# Patient Record
(demographics unavailable — no encounter records)

---

## 2024-12-26 NOTE — ASSESSMENT
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  She underwent bilateral mmg/us in 6/2024 which showed bilateral breast cysts and benign calcifications (BIRADS 2) with annual mmg recommended.  We discussed her most recent imaging in detail. We discussed it was benign.  We discussed breast cysts. They are not pre-malignant nor do they have malignant potential and are hormonally influenced. They may grow or shrink in size as well as resolve spontaneously and there is usually no intervention unless they are symptomatic. In several large studies, patients with breast cysts and a positive family history had a higher relative risk of breast cancer (from 1.5 to 3).  We discussed breast density. Increasing breast density has been found to increase ones risk of breast cancer, but at this time, there is no clear indication for additional imaging in this setting, as both US and MRI have not been found to improve survival. One can consider bilateral screening US. However, out of 1000 women screened, the use of routine US will only identify an additional 3-5 cancers. The use of US was found to increase the likelihood of undergoing more imaging and more biopsies. She does not have dense breasts. She wishes to obtain screening USs.  All questions and concerns were answered in detail.  Patient is for bilateral mmg/us in 6/2025. She is for follow up after imaging, pending any interval changes.  Total time spent on encounter was greater than 45 minutes , which included face to face time with the patient, performing an exam, reviewing previous medical records, reviewing current imaging/ pathology, documenting in patient record and coordinating care/imaging. Greater than 50% of the encounter was spent on counseling and coordination of her breast issue.

## 2024-12-26 NOTE — DATA REVIEWED
[FreeTextEntry1] : AM: [id number] - US BREAST COMPLETE BI  - ORDERED BY: RORYMARY CALVIN  EXAM: [id number] - MG MAMMO DIAG W DIMITRY BI#  - ORDERED BY: RORY CALVIN   PROCEDURE DATE:  06/10/2024    INTERPRETATION:  MAMMOGRAM:  CLINICAL INDICATION: Patient is 76 years old and is seen for diagnostic evaluation. The patient has no personal history of cancer. The patient has a history of bilateral excision in January, 2024 - benign (intraductal papilloma in the right breast and radial scar in the left breast), left MRI guided biopsy in 1/22/2019 - benign - RS/IP, left excision in 2/1/2019 - benign - 2 sites. and left breast biopsy. The patient has the following family history of breast cancer: maternal aunt, 60's and niece, 60's.  RISK ASSESSMENT: Tyrer-Kylezick Lifetime Risk: 4.8%  LAST CLINICAL BREAST EXAM: The patient reports their last clinical breast exam was performed within the past year.  COMPARISON STUDIES: The present examination has been compared to prior imaging studies dated 12/22/2023, 01/18/2023 and 12/30/2022.  Bilateral mammography was performed including CC and MLO views. Additional imaging analysis was performed using CAD (computer-aided detection) software. Digital breast tomosynthesis was performed and used in the interpretation of images.  FINDINGS: There are scattered areas of fibroglandular density.  No suspicious mass, suspicious microcalcifications, or other sign of malignancy is identified. Postsurgical changes are noted bilaterally. Scattered benign-appearing coarse calcifications and vascular calcifications are seen. Nodularity in the upper outer left breast is decreased from previous examinations.  BREAST ARTERIAL CALCIFICATION (FRANCISCO): Grade 1 - Few punctate vascular calcifications. No tram track or ring calcifications.  Note: A strong association between breast arterial calcification and cardiovascular disease has been reported. Correlation with other cardiovascular risk factors is recommended.  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  COMPARISON STUDIES: Dated  12/22/2023 and left breast biopsy dated 1/5/2024.  RIGHT BREAST: Sonographic evaluation of the right breast was performed in its entirety, including each of the four quadrants and the retroareolar region, in clockwise fashion. Images were obtained by  the technologist and submitted for interpretation.  FINDINGS: The breast parenchyma demonstrates a heterogeneous background echotexture (mixed fatty and fibroglandular).  No suspicious solid mass. Cysts are seen measuring up to 5 mm at the 5:00 position 5 cm from the nipple. At the 3:00 position 2 cm from nipple the scar site is noted. At the 9:00 position 8 cm from nipple there is suggestion of a benign-appearing lymph node measuring 7 x 3 x 6 mm, similar to that seen on 12/22/2023.  LEFT BREAST: Sonographic evaluation of the left breast was performed in its entirety, including each of the four quadrants and the retroareolar region, in clockwise fashion. Images were obtained by  the technologist and submitted for interpretation.  FINDINGS: The breast parenchyma demonstrates a heterogeneous background echotexture (mixed fatty and fibroglandular).  No suspicious solid mass. At the 3:00 retroareolar position the scar site is noted with a fluid collection measuring up to 1.6 x 0.3 x 1.5 cm. Multiple small cysts are seen measuring up to 5 mm.  IMPRESSION: No mammographic or sonographic evidence of malignancy.  RECOMMENDATION:  Mammography in 1 year.  BI-RADS 2 - Benign Finding(s)

## 2024-12-26 NOTE — PHYSICAL EXAM
[Normocephalic] : normocephalic [EOMI] : extra ocular movement intact [No Supraclavicular Adenopathy] : no supraclavicular adenopathy [No Cervical Adenopathy] : no cervical adenopathy [Examined in the supine and seated position] : examined in the supine and seated position [No dominant masses] : no dominant masses in right breast  [No dominant masses] : no dominant masses left breast [No Nipple Retraction] : no left nipple retraction [No Nipple Discharge] : no left nipple discharge [No Axillary Lymphadenopathy] : no left axillary lymphadenopathy [No Rashes] : no rashes [No Ulceration] : no ulceration [de-identified] : NL respirations

## 2024-12-26 NOTE — HISTORY OF PRESENT ILLNESS
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  FHx: aunt with breast cancer in her 80s  Her history is as follows: 1/26/24: Bilateral lumpectomies 1. Breast, left 3 o'clock; lumpectomy: - Intraductal papilloma. - Radial scar. - Biopsy site changes.  2. Breast, right; lumpectomy: - Intraductal papilloma. - Radial scar. - Biopsy site changes.  06/10/2024 b/l dx mammo and US -->birads2  scattered areas of fibroglandular density.  Postsurgical changes are noted bilaterally.  Scattered benign-appearing coarse calcifications and vascular calcifications are seen.  Nodularity in the upper outer left breast is decreased from previous examinations. RIGHT US: Cysts are seen measuring up to 5 mm at the 5:00 position 5 cm from the nipple. At the 3:00 position 2 cm from nipple the scar site is noted. At the 9:00 position 8 cm from nipple there is suggestion of a benign-appearing lymph node measuring 7 x 3 x 6 mm, similar to that seen on 12/22/2023. LEFT US: At the 3:00 retroareolar position the scar site is noted with a fluid collection measuring up to 1.6 x 0.3 x 1.5 cm. Multiple small cysts are seen measuring up to 5 mm. Rec annual screening.  Denies any current complaints. No acute changes to her breasts.

## 2024-12-26 NOTE — DATA REVIEWED
[FreeTextEntry1] : AM: [id number] - US BREAST COMPLETE BI  - ORDERED BY: RORYMARY CALVIN  EXAM: [id number] - MG MAMMO DIAG W DIMITRY BI#  - ORDERED BY: RORY CALVIN   PROCEDURE DATE:  06/10/2024    INTERPRETATION:  MAMMOGRAM:  CLINICAL INDICATION: Patient is 76 years old and is seen for diagnostic evaluation. The patient has no personal history of cancer. The patient has a history of bilateral excision in January, 2024 - benign (intraductal papilloma in the right breast and radial scar in the left breast), left MRI guided biopsy in 1/22/2019 - benign - RS/IP, left excision in 2/1/2019 - benign - 2 sites. and left breast biopsy. The patient has the following family history of breast cancer: maternal aunt, 60's and niece, 60's.  RISK ASSESSMENT: Tyrer-Kylezick Lifetime Risk: 4.8%  LAST CLINICAL BREAST EXAM: The patient reports their last clinical breast exam was performed within the past year.  COMPARISON STUDIES: The present examination has been compared to prior imaging studies dated 12/22/2023, 01/18/2023 and 12/30/2022.  Bilateral mammography was performed including CC and MLO views. Additional imaging analysis was performed using CAD (computer-aided detection) software. Digital breast tomosynthesis was performed and used in the interpretation of images.  FINDINGS: There are scattered areas of fibroglandular density.  No suspicious mass, suspicious microcalcifications, or other sign of malignancy is identified. Postsurgical changes are noted bilaterally. Scattered benign-appearing coarse calcifications and vascular calcifications are seen. Nodularity in the upper outer left breast is decreased from previous examinations.  BREAST ARTERIAL CALCIFICATION (FRANCISCO): Grade 1 - Few punctate vascular calcifications. No tram track or ring calcifications.  Note: A strong association between breast arterial calcification and cardiovascular disease has been reported. Correlation with other cardiovascular risk factors is recommended.  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  COMPARISON STUDIES: Dated  12/22/2023 and left breast biopsy dated 1/5/2024.  RIGHT BREAST: Sonographic evaluation of the right breast was performed in its entirety, including each of the four quadrants and the retroareolar region, in clockwise fashion. Images were obtained by  the technologist and submitted for interpretation.  FINDINGS: The breast parenchyma demonstrates a heterogeneous background echotexture (mixed fatty and fibroglandular).  No suspicious solid mass. Cysts are seen measuring up to 5 mm at the 5:00 position 5 cm from the nipple. At the 3:00 position 2 cm from nipple the scar site is noted. At the 9:00 position 8 cm from nipple there is suggestion of a benign-appearing lymph node measuring 7 x 3 x 6 mm, similar to that seen on 12/22/2023.  LEFT BREAST: Sonographic evaluation of the left breast was performed in its entirety, including each of the four quadrants and the retroareolar region, in clockwise fashion. Images were obtained by  the technologist and submitted for interpretation.  FINDINGS: The breast parenchyma demonstrates a heterogeneous background echotexture (mixed fatty and fibroglandular).  No suspicious solid mass. At the 3:00 retroareolar position the scar site is noted with a fluid collection measuring up to 1.6 x 0.3 x 1.5 cm. Multiple small cysts are seen measuring up to 5 mm.  IMPRESSION: No mammographic or sonographic evidence of malignancy.  RECOMMENDATION:  Mammography in 1 year.  BI-RADS 2 - Benign Finding(s)

## 2024-12-26 NOTE — PHYSICAL EXAM
[Normocephalic] : normocephalic [EOMI] : extra ocular movement intact [No Supraclavicular Adenopathy] : no supraclavicular adenopathy [No Cervical Adenopathy] : no cervical adenopathy [Examined in the supine and seated position] : examined in the supine and seated position [No dominant masses] : no dominant masses in right breast  [No dominant masses] : no dominant masses left breast [No Nipple Retraction] : no left nipple retraction [No Nipple Discharge] : no left nipple discharge [No Axillary Lymphadenopathy] : no left axillary lymphadenopathy [No Rashes] : no rashes [No Ulceration] : no ulceration [de-identified] : NL respirations

## 2025-01-29 NOTE — HISTORY OF PRESENT ILLNESS
[de-identified] : Patient presents today c/o clogged ears.  She wears hearing aids, from [employer] . She would see ENT every 3 months for  cerumen removal .

## 2025-01-29 NOTE — PHYSICAL EXAM
[de-identified] : bilateral  obstructing and impacted cerumen removed under otomicroscopy with microinstrumentation [Normal] : mucosa is normal [Midline] : trachea located in midline position

## 2025-06-18 NOTE — HISTORY OF PRESENT ILLNESS
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  FHx: aunt with breast cancer in her 80s  Her history is as follows: 1/26/24: Bilateral lumpectomies 1. Breast, left 3 o'clock; lumpectomy: - Intraductal papilloma. - Radial scar. - Biopsy site changes.  2. Breast, right; lumpectomy: - Intraductal papilloma. - Radial scar. - Biopsy site changes.  06/10/2024 b/l dx mammo and US -->birads2  scattered areas of fibroglandular density.  Postsurgical changes are noted bilaterally.  Scattered benign-appearing coarse calcifications and vascular calcifications are seen.  Nodularity in the upper outer left breast is decreased from previous examinations. RIGHT US: Cysts are seen measuring up to 5 mm at the 5:00 position 5 cm from the nipple. At the 3:00 position 2 cm from nipple the scar site is noted. At the 9:00 position 8 cm from nipple there is suggestion of a benign-appearing lymph node measuring 7 x 3 x 6 mm, similar to that seen on 12/22/2023. LEFT US: At the 3:00 retroareolar position the scar site is noted with a fluid collection measuring up to 1.6 x 0.3 x 1.5 cm. Multiple small cysts are seen measuring up to 5 mm. Rec annual screening.  6/12/2025 b/l mammo and US -->birads2 scattered areas of fibroglandular density. masses with circumscribed margins seen in both breasts. area of benign architectural distortion corresponding to the site of surgeries seen in both breasts. Scattered bilateral benign cysts are noted.

## 2025-06-18 NOTE — DATA REVIEWED
[FreeTextEntry1] : : [id number]     EXAM:  MG MAMMO SCREEN W DIMITRY BI#   ORDERED BY: TAMMY ALCAZAR  PROCEDURE DATE:  06/12/2025    INTERPRETATION:  HISTORY: Bilateral MG MAMMO SCREEN W DIMITRY BI# was performed. Patient is 77 years old and is seen for screening. The patient has no personal history of cancer. The patient has a history of bilateral excision in January, 2024 - benign, left MRI guided biopsy in 1/22/2019 - benign - RS/IP, left excision in 2/1/2019 - benign - 2 sites. and left breast biopsy. The patient has the following family history of breast cancer:  niece, breast cancer, 60's.  RISK ASSESSMENT: NCI Lifetime Risk: 6.8 Tyrer-zick Lifetime Risk: 3.3  CLINICAL BREAST EXAM: The patient reports their last clinical breast exam was performed within the past year.  COMPARISON STUDIES: The present examination has been compared to prior imaging studies performed at [facility] on 10/29/2019, 10/30/2020, 11/01/2021, 12/30/2022, 01/18/2023, 12/22/2023 and 06/10/2024.  MAMMOGRAM FINDINGS: Mammography was performed including the following views: bilateral craniocaudal with tomosynthesis, bilateral mediolateral oblique with tomosynthesis.  The examination includes digital synthetic 2D and digital tomosynthesis 3D images. Additional imaging analysis was performed using CAD (computer-aided detection) software.  There are scattered areas of fibroglandular density.  Finding 1:  There are masses with circumscribed margins seen in both breasts.  Finding 2:  There are area of benign architectural distortion corresponding to the site of surgeries seen in both breasts.  No suspicious mass, grouping of calcifications, or other abnormality is identified.  IMPRESSION: There is no mammographic evidence of malignancy.  RECOMMENDATION: Unless otherwise indicated by clinical findings, annual screening mammography recommended.  ASSESSMENT: BI-RADS Category 2:  Benign  : [id number]     EXAM:  MG US BREAST COMPLETE BI   ORDERED BY: TAMMY ALCAZAR  PROCEDURE DATE:  06/12/2025    INTERPRETATION:  HISTORY: Dense breast screening ultrasound.  The patient reports her last clinical breast examination was performed within the past year.  FAMILY HISTORY: Breast cancer diagnosed in the patient's niece.  COMPARISON: Sonography dating back to 2018.  FINDINGS:  Bilateral whole breast ultrasound was performed.  Scattered bilateral benign cysts are noted.  There are no suspicious masses in the right or left breast. There is no right or left axillary lymphadenopathy.  IMPRESSION: No sonographic evidence of malignancy.  Recommendation: Unless otherwise indicated by clinical findings, annual screening mammography recommended.  BI-RADS Category 2: Benign

## 2025-06-26 NOTE — DATA REVIEWED
[FreeTextEntry1] : : [id number]     EXAM:  MG MAMMO SCREEN W DIMITRY BI#   ORDERED BY: TAMMY ALCAZAR  PROCEDURE DATE:  06/12/2025    INTERPRETATION:  HISTORY: Bilateral MG MAMMO SCREEN W DIMITRY BI# was performed. Patient is 77 years old and is seen for screening. The patient has no personal history of cancer. The patient has a history of bilateral excision in January, 2024 - benign, left MRI guided biopsy in 1/22/2019 - benign - RS/IP, left excision in 2/1/2019 - benign - 2 sites. and left breast biopsy. The patient has the following family history of breast cancer:  niece, breast cancer, 60's.  RISK ASSESSMENT: NCI Lifetime Risk: 6.8 Tyrer-zick Lifetime Risk: 3.3  CLINICAL BREAST EXAM: The patient reports their last clinical breast exam was performed within the past year.  COMPARISON STUDIES: The present examination has been compared to prior imaging studies performed at [facility] on 10/29/2019, 10/30/2020, 11/01/2021, 12/30/2022, 01/18/2023, 12/22/2023 and 06/10/2024.  MAMMOGRAM FINDINGS: Mammography was performed including the following views: bilateral craniocaudal with tomosynthesis, bilateral mediolateral oblique with tomosynthesis.  The examination includes digital synthetic 2D and digital tomosynthesis 3D images. Additional imaging analysis was performed using CAD (computer-aided detection) software.  There are scattered areas of fibroglandular density.  Finding 1:  There are masses with circumscribed margins seen in both breasts.  Finding 2:  There are area of benign architectural distortion corresponding to the site of surgeries seen in both breasts.  No suspicious mass, grouping of calcifications, or other abnormality is identified.  IMPRESSION: There is no mammographic evidence of malignancy.  RECOMMENDATION: Unless otherwise indicated by clinical findings, annual screening mammography recommended.  ASSESSMENT: BI-RADS Category 2:  Benign  : [id number]     EXAM:  MG US BREAST COMPLETE BI   ORDERED BY: TAMMY ALCAZAR  PROCEDURE DATE:  06/12/2025    INTERPRETATION:  HISTORY: Dense breast screening ultrasound.  The patient reports her last clinical breast examination was performed within the past year.  FAMILY HISTORY: Breast cancer diagnosed in the patient's niece.  COMPARISON: Sonography dating back to 2018.  FINDINGS:  Bilateral whole breast ultrasound was performed.  Scattered bilateral benign cysts are noted.  There are no suspicious masses in the right or left breast. There is no right or left axillary lymphadenopathy.  IMPRESSION: No sonographic evidence of malignancy.  Recommendation: Unless otherwise indicated by clinical findings, annual screening mammography recommended.  BI-RADS Category 2: Benign

## 2025-06-26 NOTE — HISTORY OF PRESENT ILLNESS
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  FHx: aunt with breast cancer in her 80s  Her history is as follows: 1/26/24: Bilateral lumpectomies 1. Breast, left 3 o'clock; lumpectomy: - Intraductal papilloma. - Radial scar. - Biopsy site changes.  2. Breast, right; lumpectomy: - Intraductal papilloma. - Radial scar. - Biopsy site changes.  06/10/2024 b/l dx mammo and US -->birads2  scattered areas of fibroglandular density.  Postsurgical changes are noted bilaterally.  Scattered benign-appearing coarse calcifications and vascular calcifications are seen.  Nodularity in the upper outer left breast is decreased from previous examinations. RIGHT US: Cysts are seen measuring up to 5 mm at the 5:00 position 5 cm from the nipple. At the 3:00 position 2 cm from nipple the scar site is noted. At the 9:00 position 8 cm from nipple there is suggestion of a benign-appearing lymph node measuring 7 x 3 x 6 mm, similar to that seen on 12/22/2023. LEFT US: At the 3:00 retroareolar position the scar site is noted with a fluid collection measuring up to 1.6 x 0.3 x 1.5 cm. Multiple small cysts are seen measuring up to 5 mm. Rec annual screening.  6/12/2025 b/l mammo and US -->birads2 scattered areas of fibroglandular density. masses with circumscribed margins seen in both breasts. area of benign architectural distortion corresponding to the site of surgeries seen in both breasts. Scattered bilateral benign cysts are noted.  No acute complaints. Notes chronic on and off pain in the left breast.

## 2025-06-26 NOTE — DATA REVIEWED
[FreeTextEntry1] : : [id number]     EXAM:  MG MAMMO SCREEN W DIMITRY BI#   ORDERED BY: TAMMY ALCAZAR  PROCEDURE DATE:  06/12/2025    INTERPRETATION:  HISTORY: Bilateral MG MAMMO SCREEN W DIMITRY BI# was performed. Patient is 77 years old and is seen for screening. The patient has no personal history of cancer. The patient has a history of bilateral excision in January, 2024 - benign, left MRI guided biopsy in 1/22/2019 - benign - RS/IP, left excision in 2/1/2019 - benign - 2 sites. and left breast biopsy. The patient has the following family history of breast cancer:  niece, breast cancer, 60's.  RISK ASSESSMENT: NCI Lifetime Risk: 6.8 Tyrer-zick Lifetime Risk: 3.3  CLINICAL BREAST EXAM: The patient reports their last clinical breast exam was performed within the past year.  COMPARISON STUDIES: The present examination has been compared to prior imaging studies performed at [facility] on 10/29/2019, 10/30/2020, 11/01/2021, 12/30/2022, 01/18/2023, 12/22/2023 and 06/10/2024.  MAMMOGRAM FINDINGS: Mammography was performed including the following views: bilateral craniocaudal with tomosynthesis, bilateral mediolateral oblique with tomosynthesis.  The examination includes digital synthetic 2D and digital tomosynthesis 3D images. Additional imaging analysis was performed using CAD (computer-aided detection) software.  There are scattered areas of fibroglandular density.  Finding 1:  There are masses with circumscribed margins seen in both breasts.  Finding 2:  There are area of benign architectural distortion corresponding to the site of surgeries seen in both breasts.  No suspicious mass, grouping of calcifications, or other abnormality is identified.  IMPRESSION: There is no mammographic evidence of malignancy.  RECOMMENDATION: Unless otherwise indicated by clinical findings, annual screening mammography recommended.  ASSESSMENT: BI-RADS Category 2:  Benign  : [id number]     EXAM:  MG US BREAST COMPLETE BI   ORDERED BY: TAMMY ALCAZAR  PROCEDURE DATE:  06/12/2025    INTERPRETATION:  HISTORY: Dense breast screening ultrasound.  The patient reports her last clinical breast examination was performed within the past year.  FAMILY HISTORY: Breast cancer diagnosed in the patient's niece.  COMPARISON: Sonography dating back to 2018.  FINDINGS:  Bilateral whole breast ultrasound was performed.  Scattered bilateral benign cysts are noted.  There are no suspicious masses in the right or left breast. There is no right or left axillary lymphadenopathy.  IMPRESSION: No sonographic evidence of malignancy.  Recommendation: Unless otherwise indicated by clinical findings, annual screening mammography recommended.  BI-RADS Category 2: Benign

## 2025-06-26 NOTE — ASSESSMENT
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  Her history is as follows: 1/26/24: Bilateral lumpectomies 1. Breast, left 3 o'clock; lumpectomy: - Intraductal papilloma. - Radial scar. - Biopsy site changes.  2. Breast, right; lumpectomy: - Intraductal papilloma. - Radial scar. - Biopsy site changes.  06/10/2024 b/l dx mammo and US -->birads2  scattered areas of fibroglandular density.  Postsurgical changes are noted bilaterally.  Scattered benign-appearing coarse calcifications and vascular calcifications are seen.  Nodularity in the upper outer left breast is decreased from previous examinations. RIGHT US: Cysts are seen measuring up to 5 mm at the 5:00 position 5 cm from the nipple. At the 3:00 position 2 cm from nipple the scar site is noted. At the 9:00 position 8 cm from nipple there is suggestion of a benign-appearing lymph node measuring 7 x 3 x 6 mm, similar to that seen on 12/22/2023. LEFT US: At the 3:00 retroareolar position the scar site is noted with a fluid collection measuring up to 1.6 x 0.3 x 1.5 cm. Multiple small cysts are seen measuring up to 5 mm. Rec annual screening.  6/12/2025 b/l mammo and US -->birads2 scattered areas of fibroglandular density. masses with circumscribed margins seen in both breasts. area of benign architectural distortion corresponding to the site of surgeries seen in both breasts. Scattered bilateral benign cysts are noted.  We discussed her most recent imaging in detail. We discussed it was benign.  We discussed breast cysts. They are not pre-malignant nor do they have malignant potential and are hormonally influenced. They may grow or shrink in size as well as resolve spontaneously and there is usually no intervention unless they are symptomatic. In several large studies, patients with breast cysts and a positive family history had a higher relative risk of breast cancer (from 1.5 to 3).  We discussed breast density. Increasing breast density has been found to increase ones risk of breast cancer, but at this time, there is no clear indication for additional imaging in this setting, as both US and MRI have not been found to improve survival. One can consider bilateral screening US. However, out of 1000 women screened, the use of routine US will only identify an additional 3-5 cancers. The use of US was found to increase the likelihood of undergoing more imaging and more biopsies. She does not have dense breasts.   All questions and concerns were answered in detail.  Patient is for bilateral mmg in 6/2026. She is for follow up after imaging, pending any interval changes. For follow up sooner if she notes return of pain.  Total time spent on encounter was greater than 30 minutes , which included face to face time with the patient, performing an exam, reviewing previous medical records, reviewing current imaging/ pathology, documenting in patient record and coordinating care/imaging. Greater than 50% of the encounter was spent on counseling and coordination of her breast issue.

## 2025-06-26 NOTE — PHYSICAL EXAM
[Normocephalic] : normocephalic [EOMI] : extra ocular movement intact [No Supraclavicular Adenopathy] : no supraclavicular adenopathy [No Cervical Adenopathy] : no cervical adenopathy [Examined in the supine and seated position] : examined in the supine and seated position [No dominant masses] : no dominant masses in right breast  [No dominant masses] : no dominant masses left breast [No Nipple Retraction] : no left nipple retraction [No Nipple Discharge] : no left nipple discharge [No Axillary Lymphadenopathy] : no left axillary lymphadenopathy [No Rashes] : no rashes [No Ulceration] : no ulceration [de-identified] : nL respirations

## 2025-07-30 NOTE — PHYSICAL EXAM
[FreeTextEntry1] : Mental status: Awake, alert and oriented x3. Follows commands. No dysarthria, no aphasia. Cranial nerves: Pupils equally round and reactive to light, visual fields full, no nystagmus, extraocular muscles intact, V1 through V3 intact bilaterally and symmetric, face symmetric, hearing intact to finger rub, palate elevation symmetric, tongue was midline. Motor:  MRC grading 5/5 b/l UE/LE.   strength 5/5.  Normal tone and bulk.  No abnormal movements.  Sensation: Intact to light touch, temperature, vibration, and pinprick. Coordination: No dysmetria on finger-to-nose  Reflexes: 2+ in bilateral UE/LE Gait: Narrow and steady. No ataxia.   MOCA 16/30.

## 2025-07-30 NOTE — HISTORY OF PRESENT ILLNESS
[FreeTextEntry1] : Ms. GILES 77 year old female presents for a new patient evaluation for memory changes accompanied by her son.   Reports short-term memory lapses and difficulty expressing herself with words. She has been experiencing challenges in managing tasks. She maintains a good long-term memory and is capable of completing daily self-care tasks such as showering and dressing without assistance. However, she struggles with sleep, waking consistently at 3-4 AM and having difficulties falling back asleep. She currently manages her own and her husbands medication with assistance of his HHA. 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 Her son adds that these symptoms started approximately six months ago, shortly after moving to [place]. Silvia denies problems with identifying family members or safety concerns--e.g., leaving the stove on. Her driving has decreased - per her son she does not drive recently and has been depending on family to drive her. Also reports hearing difficulties, most recently got a new set of hearing aides 6 months ago.  Had a recent bout of back pain due to lifting her , she has followed in urgent care, started on medications for pain management and is pending Xrays She also reports chronic migraines previously treated w a neurologist in [place], managed with Topamax 100 mg daily and does well on this medication.

## 2025-07-30 NOTE — ASSESSMENT
[FreeTextEntry1] : Ms. GILES 77 year old female presents for a new patient evaluation for memory changes, MOCA 16/30.   Onset of symptoms about 6 months ago, primarily short term impairment and word finding difficulty. Will obtain neurologic work up to rule out secondary causes of symptoms and neuropsych testing for evaluation.   - Serology - AEEG - MR head wo  - neuropsychological testing - discussed possible that component of symptoms can be related to stressors/hearing.  - Patient education provided about testing processes and follow-up plan including results review - Chronic migraines managed with Topamax 100 mg daily and responds well  RTC 3-4 mo